# Patient Record
Sex: FEMALE
[De-identification: names, ages, dates, MRNs, and addresses within clinical notes are randomized per-mention and may not be internally consistent; named-entity substitution may affect disease eponyms.]

---

## 2017-12-27 ENCOUNTER — HOSPITAL ENCOUNTER (OUTPATIENT)
Dept: HOSPITAL 5 - XRAY | Age: 52
Discharge: HOME | End: 2017-12-27
Attending: INTERNAL MEDICINE
Payer: COMMERCIAL

## 2017-12-27 DIAGNOSIS — M17.0: Primary | ICD-10-CM

## 2017-12-27 DIAGNOSIS — M25.462: ICD-10-CM

## 2017-12-27 DIAGNOSIS — J45.909: ICD-10-CM

## 2017-12-27 DIAGNOSIS — M25.461: ICD-10-CM

## 2017-12-27 DIAGNOSIS — J44.9: ICD-10-CM

## 2017-12-27 DIAGNOSIS — M12.88: ICD-10-CM

## 2017-12-27 DIAGNOSIS — M47.896: ICD-10-CM

## 2017-12-27 DIAGNOSIS — G56.00: ICD-10-CM

## 2017-12-27 DIAGNOSIS — G62.9: ICD-10-CM

## 2017-12-27 PROCEDURE — 72100 X-RAY EXAM L-S SPINE 2/3 VWS: CPT

## 2017-12-27 NOTE — XRAY REPORT
AP AND LATERAL LUMBOSACRAL SPINE:



History: Back pain



Normal bone mineralization. There is moderate disc space narrowing with 

anterior spurring near the thoracolumbar junction. There is moderate to 

severe hypertrophic facet arthropathy at L3-4, L4-5 and L5-S1. Grade 

one anterolisthesis of L4 with respect to L5 measures 3-4 mm. No 

evidence for fracture or bone lesion.



IMPRESSION:

Lumbar spondylosis as described.

## 2017-12-27 NOTE — XRAY REPORT
BILATERAL KNEES, 2 VIEWS



History: Bilateral knee pain.



Findings: Normal bone mineralization. Moderate osteoarthritic changes 

are identified in the medial compartments and patellofemoral spaces of 

both knees. No fracture, bone lesion or osteochondral defect is 

identified. Small bilateral joint effusions.



Impression: Osteoarthritis. Small knee effusions. No acute process 

noted.

## 2018-06-05 ENCOUNTER — HOSPITAL ENCOUNTER (EMERGENCY)
Dept: HOSPITAL 5 - ED | Age: 53
LOS: 1 days | Discharge: HOME | End: 2018-06-06
Payer: MEDICAID

## 2018-06-05 DIAGNOSIS — M19.90: ICD-10-CM

## 2018-06-05 DIAGNOSIS — F17.200: ICD-10-CM

## 2018-06-05 DIAGNOSIS — I10: ICD-10-CM

## 2018-06-05 DIAGNOSIS — Z79.899: ICD-10-CM

## 2018-06-05 DIAGNOSIS — J45.31: Primary | ICD-10-CM

## 2018-06-05 DIAGNOSIS — G62.9: ICD-10-CM

## 2018-06-05 DIAGNOSIS — J06.9: ICD-10-CM

## 2018-06-05 DIAGNOSIS — G89.29: ICD-10-CM

## 2018-06-05 PROCEDURE — 94640 AIRWAY INHALATION TREATMENT: CPT

## 2018-06-05 PROCEDURE — 99283 EMERGENCY DEPT VISIT LOW MDM: CPT

## 2018-06-06 VITALS — DIASTOLIC BLOOD PRESSURE: 73 MMHG | SYSTOLIC BLOOD PRESSURE: 156 MMHG

## 2018-06-06 NOTE — EMERGENCY DEPARTMENT REPORT
ED Asthma HPI





- General


Chief Complaint: Adult Asthma


Stated Complaint: VAISHALI


Time Seen by Provider: 18 03:26


Source: patient


Mode of arrival: Ambulatory


Limitations: No Limitations





- History of Present Illness


Initial Comments: 





Patient reports that she is out of her asthma inhaler and she is having cough, 

worse this slightly thin.  She says she needs a refill.  Denies any nausea or 

vomiting.  Denies any fever or chills.  Denies any chest pain or shortness of 

breath.  Pain is 0-10.  No medication taken prior to coming to the emergency 

room because she said she is out of her inhaler.  She says she started with 

runny nose and nasal congestion about a week ago and it turned into asthma.


MD Complaint: "asthma attack", wheezing


Onset/Timin


-: week(s)


Asthma History: adult onset, history of prior ED visit


Context: recent URI, ran out of meds


Associated Symptoms: dry cough, other (hoarseness).  denies: productive cough, 

fever, chest pain, hemoptysis, leg edema, syncope


Treatments Prior to Arrival: other (none)





- Related Data


Current Asthma Therapy: inhaled bronchodilator


 Home Medications











 Medication  Instructions  Recorded  Confirmed  Last Taken


 


Fluticasone Propionate [Flovent 1 puff IH BID 14





Diskus]    


 


Meloxicam [Mobic] 15 mg PO QDAY 14 08:00








 Previous Rx's











 Medication  Instructions  Recorded  Last Taken  Type


 


Albuterol  *Only Ed* [Proventil 2.5 mg IH Q3H PRN #60 nebu 14 Unknown Rx





0.5% NEBS]    


 


Ipratropium/Albuterol Sulfate 1 ampul IH Q6HRT #60 ampul.neb 14 Unknown Rx





[DUONEB *Not for PRN Use*]    


 


oxyCODONE /ACETAMINOPHEN [Percocet 1 tab PO Q6H PRN #30 tablet 14 Unknown 

Rx





5/325 mg]    


 


ALBUTEROL Inhaler [ProAir HFA 2 puff INHALATION Q6H PRN #1 inha 18 

Unknown Rx





Inhaler]    


 


ALBUTEROL NEB's [Proventil 0.083% 3 ml INHALATION Q6H PRN #1 pack 06/06/18 

Unknown Rx





NEBS]    


 


Azithromycin [Zithromax Z-DENIS] 250 mg PO DAILY 5 Days #6 tab 18 Unknown Rx


 


Cetirizine HCl [ZyrTEC] 10 mg PO QDAY 14 Days #14 capsule 18 Unknown Rx


 


Fluticasone [Flonase] 1 spray NS QDAY 14 Days #1 bottle 18 Unknown Rx











 Allergies











Allergy/AdvReac Type Severity Reaction Status Date / Time


 


No Known Allergies Allergy   Unverified 14 15:25














ED Review of Systems


ROS: 


Stated complaint: VAISHALI


Other details as noted in HPI





Constitutional: denies: chills, fever


Eyes: denies: eye pain, eye discharge, vision change


ENT: congestion, other (hoarseness due to drainage).  denies: ear pain, throat 

pain


Respiratory: cough.  denies: shortness of breath, SOB with exertion, SOB at rest

, stridor, wheezing


Cardiovascular: denies: chest pain, palpitations, edema, syncope


Gastrointestinal: denies: abdominal pain, nausea, vomiting


Musculoskeletal: denies: back pain, joint swelling, arthralgia, myalgia


Skin: denies: rash, lesions


Neurological: denies: headache, weakness, paresthesias, abnormal gait, vertigo





ED Past Medical Hx





- Past Medical History


Previous Medical History?: Yes


Hx Congestive Heart Failure: No


Hx Diabetes: No


Hx Arthritis: Yes


Hx Asthma: Yes


Hx COPD: No


Additional medical history: neuropathy,carpal tunnel, chronic back pain





- Surgical History


Past Surgical History?: Yes


Additional Surgical History: carpal tunnel





- Family History


Family history: hypertension





- Social History


Smoking Status: Current Every Day Smoker


Substance Use Type: None





- Medications


Home Medications: 


 Home Medications











 Medication  Instructions  Recorded  Confirmed  Last Taken  Type


 


Fluticasone Propionate [Flovent 1 puff IH BID 14 History





Diskus]     


 


Meloxicam [Mobic] 15 mg PO QDAY 14 08:00 History


 


Albuterol  *Only Ed* [Proventil 2.5 mg IH Q3H PRN #60 nebu 14  Unknown Rx





0.5% NEBS]     


 


Ipratropium/Albuterol Sulfate 1 ampul IH Q6HRT #60 ampul.neb 14  Unknown 

Rx





[DUONEB *Not for PRN Use*]     


 


oxyCODONE /ACETAMINOPHEN [Percocet 1 tab PO Q6H PRN #30 tablet 14  

Unknown Rx





5/325 mg]     


 


ALBUTEROL Inhaler [ProAir HFA 2 puff INHALATION Q6H PRN #1 inha 18  

Unknown Rx





Inhaler]     


 


ALBUTEROL NEB's [Proventil 0.083% 3 ml INHALATION Q6H PRN #1 pack 18  

Unknown Rx





NEBS]     


 


Azithromycin [Zithromax Z-DENIS] 250 mg PO DAILY 5 Days #6 tab 18  Unknown 

Rx


 


Cetirizine HCl [ZyrTEC] 10 mg PO QDAY 14 Days #14 capsule 18  Unknown Rx


 


Fluticasone [Flonase] 1 spray NS QDAY 14 Days #1 bottle 18  Unknown Rx














ED Physical Exam





- General


Limitations: No Limitations


General appearance: alert, in no apparent distress





- Head


Head exam: Present: atraumatic, normocephalic, normal inspection





- Eye


Eye exam: Present: normal appearance, PERRL, EOMI


Pupils: Present: normal accommodation





- ENT


ENT exam: Present: normal exam, normal orophraynx, mucous membranes moist, 

normal external ear exam, other (lateral nasal mucosa pale and boggy).  Absent: 

TM's normal bilaterally (bilateral TM congested without erythema)





- Neck


Neck exam: Present: normal inspection, full ROM, other (no C-spine tenderness).

  Absent: tenderness, meningismus, lymphadenopathy





- Respiratory


Respiratory exam: Present: wheezes, other (dry cough).  Absent: normal lung 

sounds bilaterally, respiratory distress, rales, rhonchi, stridor, chest wall 

tenderness, accessory muscle use, decreased breath sounds, prolonged expiratory





- Cardiovascular


Cardiovascular Exam: Present: regular rate, normal rhythm, normal heart sounds.

  Absent: systolic murmur, diastolic murmur





- GI/Abdominal


GI/Abdominal exam: Present: soft, normal bowel sounds.  Absent: tenderness





- Extremities Exam


Extremities exam: Present: normal inspection, full ROM, normal capillary refill

, other (no clubbing, cyanosis or edema.  +2 pulses all extremities and no 

neurovascular compromise).  Absent: tenderness, pedal edema, joint swelling, 

calf tenderness





- Back Exam


Back exam: Present: normal inspection, full ROM.  Absent: tenderness, CVA 

tenderness (L)





- Neurological Exam


Neurological exam: Present: alert, oriented X3, normal gait, reflexes normal.  

Absent: motor sensory deficit





- Psychiatric


Psychiatric exam: Present: normal affect, normal mood





- Skin


Skin exam: Present: warm, dry, intact, normal color.  Absent: rash





ED Course


 Vital Signs











  18





  21:16 21:20 21:31


 


Temperature 98.4 F  


 


Pulse Rate 74  


 


Pulse Rate [  66 70





Anterior   





Bilateral   





Throughout]   


 


Respiratory 18  





Rate   


 


Respiratory  24 20





Rate [Anterior   





Bilateral   





Throughout]   


 


Blood Pressure 167/107  


 


Blood Pressure   





[Right]   


 


O2 Sat by Pulse 98  





Oximetry   














  18





  03:55 04:32


 


Temperature 97.6 F 97.7 F


 


Pulse Rate 57 L 56 L


 


Pulse Rate [  





Anterior  





Bilateral  





Throughout]  


 


Respiratory 18 18





Rate  


 


Respiratory  





Rate [Anterior  





Bilateral  





Throughout]  


 


Blood Pressure  


 


Blood Pressure 167/97 156/73





[Right]  


 


O2 Sat by Pulse 99 100





Oximetry  














- Reevaluation(s)


Reevaluation #1: 





18 03:41


Patient received 1 amp nebulizer in triage area and still with some wheezing 

and cough and so she'll receive an additional DuoNeb with 60 mg of prednisone 

and we will reevaluate.  Also recheck blood pressure as its elevated








Reevaluation #2: 





18 05:11


Patient was given additional DuoNeb 1 treatment along with the physical 60 mg 

by mouth and upon reevaluation her lung sounds are clear and she says she is 

feeling much better.





ED Medical Decision Making





- Medical Decision Making





ED course:





This is a 52-year-old female here for asthma exacerbation which she said 

started off as upper respiratory now he is wheezing and coughing.  He said he 

ran out of his medication and here to see if he can get refill.





Patient was seen by myself and examined and he is stable after nebulizer 

treatments and steroid.  Patient states that she is feeling better.  I 

discussed diagnosis and treatment plan the patient and she voiced understanding.





A/P


1: Acute exacerbation of asthma, mild-nebulizer treatment in emergency room to 

include DuoNeb 2 and prednisone 60 mg by mouth and will discharge home and 

prednisone and refill his albuterol inhaler and nebulizer and will discharge 

with Z-Denis since she's been having symptoms for over a week.


2: Upper respiratory with cough and congestion-sent home in Zyrtec and Flonase.





Patient given prescription for Z-Denis, Zyrtec, Flonase, prednisone Dosepak and 

albuterol.





Pt educated on diagnosis, medication, need to follow-up, and since she does  

have a primary care physician who is Dr. Adam management of chronic asthma and 

follow-up status post asthma exacerbation





Patient discharged home in stable condition to follow-up with primary care as 

referred.  Her vital signs are stable she is afebrile.  She said he is feeling 

much better.  I discussed with her to return to the emergency room if his 

symptoms turns and/or worsens 


Critical care attestation.: 


If time is entered above; I have spent that time in minutes in the direct care 

of this critically ill patient, excluding procedure time.








ED Disposition


Clinical Impression: 


 Upper respiratory infection with cough and congestion





Asthma attack


Qualifiers:


 Asthma severity: mild Asthma persistence: persistent Qualified Code(s): J45.31 

- Mild persistent asthma with (acute) exacerbation





Disposition:  TO HOME OR SELFCARE


Is pt being admited?: No


Does the pt Need Aspirin: No


Condition: Stable


Instructions:  Asthma (ED), Upper Respiratory Infection (ED), Acute Cough (ED)


Additional Instructions: 


Please increase your fluid intake


Flush nostrils with saline nasal spray 


take antibiotic as prescribed


Take albuterol nebulizer every 6 hours 2 days and then as needed.


Take Zyrtec and Flonase for allergic rhinitis, upper respiratory tract 

infection will cough and congestion


F/U  with primary care physician as instructed


Prescriptions: 


ALBUTEROL Inhaler [ProAir HFA Inhaler] 2 puff INHALATION Q6H PRN #1 inha


 PRN Reason: Shortness Of Breath


ALBUTEROL NEB's [Proventil 0.083% NEBS] 3 ml INHALATION Q6H PRN #1 pack


 PRN Reason: wheezing and cough


Azithromycin [Zithromax Z-DENIS] 250 mg PO DAILY 5 Days #6 tab


Cetirizine HCl [ZyrTEC] 10 mg PO QDAY 14 Days #14 capsule


Fluticasone [Flonase] 1 spray NS QDAY 14 Days #1 bottle


Referrals: 


HALIMA ADAM MD [Staff Physician] - 18


DIO WONG MD [Staff Physician] - 18


Forms:  Work/School Release Form(ED)

## 2020-09-24 ENCOUNTER — HOSPITAL ENCOUNTER (EMERGENCY)
Dept: HOSPITAL 5 - ED | Age: 55
Discharge: HOME | End: 2020-09-24
Payer: COMMERCIAL

## 2020-09-24 VITALS — SYSTOLIC BLOOD PRESSURE: 178 MMHG | DIASTOLIC BLOOD PRESSURE: 98 MMHG

## 2020-09-24 DIAGNOSIS — M19.90: ICD-10-CM

## 2020-09-24 DIAGNOSIS — J44.9: Primary | ICD-10-CM

## 2020-09-24 DIAGNOSIS — Z79.899: ICD-10-CM

## 2020-09-24 DIAGNOSIS — F17.200: ICD-10-CM

## 2020-09-24 PROCEDURE — 99282 EMERGENCY DEPT VISIT SF MDM: CPT

## 2020-09-24 NOTE — EMERGENCY DEPARTMENT REPORT
ED General Adult HPI





- General


Chief complaint: Adult Asthma


Stated complaint: ASTHMA AND ALLERGIES


Time Seen by Provider: 09/24/20 10:13


Source: patient


Mode of arrival: Ambulatory


Limitations: No Limitations





- History of Present Illness


Initial comments: 





Is a pleasant 34-year-old female presents the emergency department with chief 

complaint of cough and tightness in her chest.  Patient has a past medical 

history of asthma and reports she is out of her nebulizer and inhalers.  She 

states this happens every year this time of year.  She reports symptoms started 

2 days ago and she had been sleeping with her windows open is concerned maybe 

the change in the weather may have caused her symptoms again.  She denies any 

associated fever, chills, night sweats, headache, dizziness, blurry vision, 

nausea,, diarrhea, chest pain, shortness of breath.  She denies any sick 

contacts or known exposures to anyone with COVID-19.  She does report she smokes

tobacco daily.





- Related Data


                                Home Medications











 Medication  Instructions  Recorded  Confirmed  Last Taken


 


Fluticasone Propionate [Flovent 1 puff IH BID 01/06/14 01/06/14 01/06/14





Diskus]    


 


Meloxicam [Mobic] 15 mg PO QDAY 01/06/14 01/07/14 01/06/14 08:00








                                  Previous Rx's











 Medication  Instructions  Recorded  Last Taken  Type


 


Albuterol  *Only Ed* [Proventil 2.5 mg IH Q3H PRN #60 nebu 01/09/14 Unknown Rx





0.5% NEBS]    


 


oxyCODONE /ACETAMINOPHEN [Percocet 1 tab PO Q6H PRN #30 tablet 01/09/14 Unknown 

Rx





5/325 mg]    


 


ALBUTEROL NEB's [Proventil 0.083% 3 ml INHALATION Q6H PRN #1 pack 06/06/18 

Unknown Rx





NEBS]    


 


Fluticasone [Flonase] 1 spray NS QDAY 14 Days #1 bottle 06/06/18 Unknown Rx


 


Albuterol Mdi (or & Nicu Only) 2 puff INHALATION Q6H PRN #1 inha 09/24/20 

Unknown Rx





[ProAir HFA Inhaler]    


 


Azithromycin [Zithromax Z-DALILA] 250 mg PO DAILY 5 Days #6 tab 09/24/20 Unknown Rx


 


Cetirizine HCl [ZyrTEC 10mg cap] 10 mg PO QDAY 14 Days #14 capsule 09/24/20 

Unknown Rx


 


Ipratropium/Albuterol Sulfate 1 ampul IH Q6HRT #60 ampul.neb 09/24/20 Unknown Rx





[DUONEB *Not for PRN Use*]    











                                    Allergies











Allergy/AdvReac Type Severity Reaction Status Date / Time


 


No Known Allergies Allergy   Unverified 01/06/14 15:25














ED Review of Systems


ROS: 


Stated complaint: ASTHMA AND ALLERGIES


Other details as noted in HPI





Comment: All other systems reviewed and negative


Constitutional: denies: chills, fever


Eyes: denies: eye pain, eye discharge, vision change


ENT: denies: ear pain, throat pain


Respiratory: no symptoms reported, cough, wheezing.  denies: shortness of breath


Cardiovascular: denies: chest pain, palpitations


Endocrine: no symptoms reported


Gastrointestinal: denies: abdominal pain, nausea, diarrhea


Genitourinary: denies: urgency, dysuria, discharge


Musculoskeletal: denies: back pain, joint swelling, arthralgia


Skin: denies: rash, lesions


Neurological: denies: headache, weakness, paresthesias


Psychiatric: denies: anxiety, depression


Hematological/Lymphatic: denies: easy bleeding, easy bruising





ED Past Medical Hx





- Past Medical History


Previous Medical History?: Yes


Hx Congestive Heart Failure: No


Hx Diabetes: No


Hx Arthritis: Yes


Hx Asthma: Yes


Hx COPD: No


Additional medical history: neuropathy,carpal tunnel, chronic back pain





- Surgical History


Past Surgical History?: Yes


Additional Surgical History: carpal tunnel





- Social History


Smoking Status: Current Every Day Smoker





- Medications


Home Medications: 


                                Home Medications











 Medication  Instructions  Recorded  Confirmed  Last Taken  Type


 


Fluticasone Propionate [Flovent 1 puff IH BID 01/06/14 01/06/14 01/06/14 History





Diskus]     


 


Meloxicam [Mobic] 15 mg PO QDAY 01/06/14 01/07/14 01/06/14 08:00 History


 


Albuterol  *Only Ed* [Proventil 2.5 mg IH Q3H PRN #60 nebu 01/09/14  Unknown Rx





0.5% NEBS]     


 


oxyCODONE /ACETAMINOPHEN [Percocet 1 tab PO Q6H PRN #30 tablet 01/09/14  Unknown

 Rx





5/325 mg]     


 


ALBUTEROL NEB's [Proventil 0.083% 3 ml INHALATION Q6H PRN #1 pack 06/06/18  

Unknown Rx





NEBS]     


 


Fluticasone [Flonase] 1 spray NS QDAY 14 Days #1 bottle 06/06/18  Unknown Rx


 


Albuterol Mdi (or & Nicu Only) 2 puff INHALATION Q6H PRN #1 inha 09/24/20  Unkn

own Rx





[ProAir HFA Inhaler]     


 


Azithromycin [Zithromax Z-DALILA] 250 mg PO DAILY 5 Days #6 tab 09/24/20  Unknown 

Rx


 


Cetirizine HCl [ZyrTEC 10mg cap] 10 mg PO QDAY 14 Days #14 capsule 09/24/20  Unk

nown Rx


 


Ipratropium/Albuterol Sulfate 1 ampul IH Q6HRT #60 ampul.neb 09/24/20  Unknown 

Rx





[DUONEB *Not for PRN Use*]     














ED Physical Exam





- General


Limitations: No Limitations


General appearance: alert, in no apparent distress





- Head


Head exam: Present: atraumatic, normocephalic





- Eye


Eye exam: Present: normal appearance, PERRL, EOMI


Pupils: Present: normal accommodation





- ENT


ENT exam: Present: normal exam, normal orophraynx, mucous membranes moist





- Neck


Neck exam: Present: normal inspection, full ROM.  Absent: tenderness, 

meningismus





- Respiratory


Respiratory exam: Present: normal lung sounds bilaterally.  Absent: respiratory 

distress, wheezes, rales, rhonchi, stridor





- Cardiovascular


Cardiovascular Exam: Present: regular rate, normal rhythm.  Absent: systolic 

murmur, diastolic murmur, rubs, gallop





- GI/Abdominal


GI/Abdominal exam: Present: soft, normal bowel sounds.  Absent: distended, 

tenderness, guarding, rebound, rigid





- Extremities Exam


Extremities exam: Present: normal inspection, full ROM, normal capillary refill.

 Absent: tenderness, calf tenderness





- Back Exam


Back exam: Present: normal inspection, full ROM.  Absent: tenderness, CVA 

tenderness (R), CVA tenderness (L)





- Neurological Exam


Neurological exam: Present: alert, oriented X3





- Psychiatric


Psychiatric exam: Present: normal affect, normal mood





- Skin


Skin exam: Present: warm, dry, intact, normal color.  Absent: rash





ED Course





                                   Vital Signs











  09/24/20





  09:50


 


Temperature 98.5 F


 


Pulse Rate 79


 


Respiratory 20





Rate 


 


Blood Pressure 178/98


 


O2 Sat by Pulse 96





Oximetry 














ED Medical Decision Making





- Medical Decision Making





Patient is nontoxic in no acute distress.  Vital signs are stable.  She was not 

hypoxic, had no wheezing or abnormal lung sounds, and had no increased work of 

breathing.  I did initially order a chest x-ray however the patient politely 

declined states she does want a refill of her medications.  I did educate her 

that she should return the emerge department meal if she develops any change or 

worsening symptoms.  She verbalized understanding of the diagnosis, treatment 

plan and follow-up instructions will follow-up with her primary care doctor.





- Differential Diagnosis


copd, asthma, URI, pneumonia 


Critical care attestation.: 


If time is entered above; I have spent that time in minutes in the direct care 

of this critically ill patient, excluding procedure time.








ED Disposition


Clinical Impression: 


COPD (chronic obstructive pulmonary disease)


Qualifiers:


 COPD type: COPD with acute exacerbation Qualified Code(s): J44.1 - Chronic 

obstructive pulmonary disease with (acute) exacerbation





Disposition: DC-01 TO HOME OR SELFCARE


Is pt being admited?: No


Condition: Stable


Instructions:  Chronic Obstructive Pulmonary Disease (ED)


Prescriptions: 


Ipratropium/Albuterol Sulfate [DUONEB *Not for PRN Use*] 1 ampul IH Q6HRT #60 

ampul.neb


Albuterol Mdi (or & Nicu Only) [ProAir HFA Inhaler] 2 puff INHALATION Q6H PRN #1

inha


 PRN Reason: Shortness Of Breath


Azithromycin [Zithromax Z-DALILA] 250 mg PO DAILY 5 Days #6 tab


Cetirizine HCl [ZyrTEC 10mg cap] 10 mg PO QDAY 14 Days #14 capsule


Referrals: 


[facility] [Provider Group] - 3-5 Days


Time of Disposition: 10:49

## 2021-04-18 ENCOUNTER — HOSPITAL ENCOUNTER (EMERGENCY)
Dept: HOSPITAL 5 - ED | Age: 56
Discharge: HOME | End: 2021-04-18
Payer: MEDICAID

## 2021-04-18 VITALS — SYSTOLIC BLOOD PRESSURE: 112 MMHG | DIASTOLIC BLOOD PRESSURE: 61 MMHG

## 2021-04-18 DIAGNOSIS — Z98.890: ICD-10-CM

## 2021-04-18 DIAGNOSIS — M62.830: Primary | ICD-10-CM

## 2021-04-18 DIAGNOSIS — M19.91: ICD-10-CM

## 2021-04-18 DIAGNOSIS — J45.909: ICD-10-CM

## 2021-04-18 DIAGNOSIS — Z79.899: ICD-10-CM

## 2021-04-18 DIAGNOSIS — F17.200: ICD-10-CM

## 2021-04-18 PROCEDURE — 99281 EMR DPT VST MAYX REQ PHY/QHP: CPT

## 2021-04-18 NOTE — EMERGENCY DEPARTMENT REPORT
ED Back Pain/Injury HPI





- General


Chief Complaint: Back Pain/Injury


Stated Complaint: MUSCLE SPASMS IN BACK


Time Seen by Provider: 04/18/21 18:19


Source: patient


Limitations: No Limitations





- History of Present Illness


Initial Comments: 


55-year-old female with a past medical history of hypertension, hyperlipidemia, 

asthma, asthma and obesity presents to the ER today with complaints of right 

lower back pain.  Patient states that it feels like she is having spasms in her 

right lower back.  She states that it started gradually about 3 days ago, it was

initially intermittent but has become more constant.  She states that the pain 

is worse with movement.  She denies any radiation of the pain down into her leg.

 She denies any associated bowel or bladder incontinence.  She denies any saddle

anesthesia.  She denies any urinary retention or constipation.  She reports her 

typical chronic lower extremity neuropathy but nothing worse since her back 

pain.  She denies any associated abdominal pain or chest pain.  She states that 

she been taking Tylenol and naproxen without relief.  She states that she has 

had lower back pain in the past, but she has never officially been seen by a 

provider or orthopedic specialist to figure out what is been going on with her 

back.


MD Complaint: back pain


-: Gradual, days(s) (3)





- Related Data


                                Home Medications











 Medication  Instructions  Recorded  Confirmed  Last Taken


 


Fluticasone Propionate [Flovent 1 puff IH BID 01/06/14 01/06/14 01/06/14





Diskus]    


 


Meloxicam [Mobic] 15 mg PO QDAY 01/06/14 01/07/14 01/06/14 08:00








                                  Previous Rx's











 Medication  Instructions  Recorded  Last Taken  Type


 


Albuterol  *Only Ed* [Proventil 2.5 mg IH Q3H PRN #60 nebu 01/09/14 Unknown Rx





0.5% NEBS]    


 


ALBUTEROL NEB's [Proventil 0.083% 3 ml INHALATION Q6H PRN #1 pack 06/06/18 

Unknown Rx





NEBS]    


 


Albuterol Mdi (or & Nicu Only) 2 puff INHALATION Q6H PRN #1 inha 09/24/20 

Unknown Rx





[ProAir HFA Inhaler]    


 


Azithromycin [Zithromax Z-DALILA] 250 mg PO DAILY 5 Days #6 tab 09/24/20 Unknown Rx


 


Ipratropium/Albuterol Sulfate 1 ampul IH Q6HRT #60 ampul.neb 09/24/20 Unknown Rx





[DUONEB *Not for PRN Use*]    


 


Ketorolac [Toradol] 10 mg PO Q6H PRN #20 tablet 04/18/21 Unknown Rx


 


Lidocaine [Lidoderm] 1 each TP Q12HR #10 adh..patch 04/18/21 Unknown Rx


 


methOCARBAMOL [Robaxin TAB] 750 mg PO Q8H PRN #30 tablet 04/18/21 Unknown Rx











                                    Allergies











Allergy/AdvReac Type Severity Reaction Status Date / Time


 


No Known Allergies Allergy   Verified 04/18/21 17:56














ED Review of Systems


ROS: 


Stated complaint: MUSCLE SPASMS IN BACK


Other details as noted in HPI





Comment: All other systems reviewed and negative


Constitutional: denies: chills, fever


Eyes: denies: eye pain, eye discharge, vision change


ENT: denies: ear pain, throat pain


Cardiovascular: denies: chest pain, palpitations, dyspnea on exertion, edema, 

syncope, paroxysmal nocturnal dyspnea


Endocrine: no symptoms reported


Gastrointestinal: denies: abdominal pain, nausea, diarrhea, constipation, 

hematemesis, melena, hematochezia


Genitourinary: denies: urgency, dysuria, frequency, hematuria, discharge, 

abnormal menses, dyspareunia


Musculoskeletal: back pain.  denies: joint swelling, arthralgia, myalgia, other


Skin: denies: rash, lesions, change in color, change in hair/nails, pruritus


Neurological: denies: headache, weakness, confusion, abnormal gait, vertigo


Psychiatric: denies: anxiety, depression, auditory hallucinations, visual 

hallucinations, homicidal thoughts


Hematological/Lymphatic: denies: easy bleeding, easy bruising





ED Past Medical Hx





- Past Medical History


Hx Congestive Heart Failure: No


Hx Diabetes: No


Hx Arthritis: Yes


Hx Asthma: Yes


Hx COPD: No


Additional medical history: neuropathy,carpal tunnel, chronic back pain





- Surgical History


Additional Surgical History: carpal tunnel





- Social History


Smoking Status: Current Every Day Smoker


Substance Use Type: None





- Medications


Home Medications: 


                                Home Medications











 Medication  Instructions  Recorded  Confirmed  Last Taken  Type


 


Fluticasone Propionate [Flovent 1 puff IH BID 01/06/14 01/06/14 01/06/14 History





Diskus]     


 


Meloxicam [Mobic] 15 mg PO QDAY 01/06/14 01/07/14 01/06/14 08:00 History


 


Albuterol  *Only Ed* [Proventil 2.5 mg IH Q3H PRN #60 nebu 01/09/14  Unknown Rx





0.5% NEBS]     


 


ALBUTEROL NEB's [Proventil 0.083% 3 ml INHALATION Q6H PRN #1 pack 06/06/18  

Unknown Rx





NEBS]     


 


Albuterol Mdi (or & Nicu Only) 2 puff INHALATION Q6H PRN #1 inha 09/24/20  

Unknown Rx





[ProAir HFA Inhaler]     


 


Azithromycin [Zithromax Z-DALILA] 250 mg PO DAILY 5 Days #6 tab 09/24/20  Unknown 

Rx


 


Ipratropium/Albuterol Sulfate 1 ampul IH Q6HRT #60 ampul.neb 09/24/20  Unknown 

Rx





[DUONEB *Not for PRN Use*]     


 


Ketorolac [Toradol] 10 mg PO Q6H PRN #20 tablet 04/18/21  Unknown Rx


 


Lidocaine [Lidoderm] 1 each TP Q12HR #10 adh..patch 04/18/21  Unknown Rx


 


methOCARBAMOL [Robaxin TAB] 750 mg PO Q8H PRN #30 tablet 04/18/21  Unknown Rx














ED Physical Exam





- General


Limitations: No Limitations


General appearance: alert, in no apparent distress, obese





- Head


Head exam: Present: atraumatic, normocephalic, normal inspection





- Neck


Neck exam: Present: normal inspection, full ROM





- Respiratory


Respiratory exam: Present: normal lung sounds bilaterally.  Absent: respiratory 

distress





- Cardiovascular


Cardiovascular Exam: Present: regular rate, normal rhythm, normal heart sounds





- GI/Abdominal


GI/Abdominal exam: Present: soft.  Absent: distended, tenderness, guarding, 

rebound





- Extremities Exam


Extremities exam: Present: normal inspection, full ROM, normal capillary refill.

 Absent: pedal edema, calf tenderness





- Back Exam


Back exam: Present: full ROM, paraspinal tenderness (right lower lumbar area).  

Absent: normal inspection, CVA tenderness (R), CVA tenderness (L), vertebral 

tenderness, rash noted





- Neurological Exam


Neurological exam: Present: alert, oriented X3, CN II-XII intact, normal gait





- Psychiatric


Psychiatric exam: Present: normal affect, normal mood





- Skin


Skin exam: Present: intact





ED Course


                                   Vital Signs











  04/18/21





  17:53


 


Temperature 98.5 F


 


Pulse Rate 95 H


 


Respiratory 20





Rate 


 


Blood Pressure 112/61


 


O2 Sat by Pulse 98





Oximetry 














ED Medical Decision Making





- Medical Decision Making


The patient presented with acute back pain.  The patient is resting comfortably 

and is alert, talkative, interactive and in no distress.   The patient is 

neurologically intact and is ambulatory in the ED. the patient has no fever, no 

bowel or bladder incontinence, no saddle anesthesia and is otherwise alert and 

well-appearing.  Her history, physical examination and diagnostic testing does 

not suggest the presence of acute spinal epidural abscess, acute epidural bleed,

cauda equina syndrome, abdominal/thoracic aortic aneurysm, aortic dissection or 

other acute process requiring further testing, treatment or consultation in the 

emergency department.  Her vital signs have been stable.  The patient condition 

is stable and appropriate for discharge.  The patient will pursue further 

outpatient evaluation with the primary care physician or other designated or 

consulting physician as indicated in the discharge instructions.





Critical care attestation.: 


If time is entered above; I have spent that time in minutes in the direct care 

of this critically ill patient, excluding procedure time.








ED Disposition


Clinical Impression: 


 Lumbar paraspinal muscle spasm





Disposition: DC-01 TO HOME OR SELFCARE


Is pt being admited?: No


Does the pt Need Aspirin: No


Condition: Stable


Instructions:  Muscle Cramps and Spasms, Acute Back Pain, Adult


Additional Instructions: 


Take the Robaxin, Toradol and use the Lidoderm patches as prescribed.  Recommend

that you follow-up with orthopedic specialist in 1 week if your symptoms 

persist.  Return to the ER if your symptoms worsens or changes in any way.


Prescriptions: 


Lidocaine [Lidoderm] 1 each TP Q12HR #10 adh..patch


methOCARBAMOL [Robaxin TAB] 750 mg PO Q8H PRN #30 tablet


 PRN Reason: Spasms


Ketorolac [Toradol] 10 mg PO Q6H PRN #20 tablet


 PRN Reason: Pain


Referrals: 


[facility] [Other] - 3-5 Days


Time of Disposition: 18:40

## 2021-08-29 ENCOUNTER — HOSPITAL ENCOUNTER (EMERGENCY)
Dept: HOSPITAL 5 - ED | Age: 56
Discharge: HOME | End: 2021-08-29
Payer: MEDICAID

## 2021-08-29 VITALS — SYSTOLIC BLOOD PRESSURE: 100 MMHG | DIASTOLIC BLOOD PRESSURE: 61 MMHG

## 2021-08-29 DIAGNOSIS — M19.90: ICD-10-CM

## 2021-08-29 DIAGNOSIS — M54.9: ICD-10-CM

## 2021-08-29 DIAGNOSIS — L02.31: Primary | ICD-10-CM

## 2021-08-29 DIAGNOSIS — Z98.890: ICD-10-CM

## 2021-08-29 DIAGNOSIS — G56.00: ICD-10-CM

## 2021-08-29 DIAGNOSIS — G62.9: ICD-10-CM

## 2021-08-29 DIAGNOSIS — F17.200: ICD-10-CM

## 2021-08-29 DIAGNOSIS — J45.909: ICD-10-CM

## 2021-08-29 PROCEDURE — 99282 EMERGENCY DEPT VISIT SF MDM: CPT

## 2022-03-29 ENCOUNTER — HOSPITAL ENCOUNTER (OUTPATIENT)
Dept: HOSPITAL 5 - XRAY | Age: 57
Discharge: HOME | End: 2022-03-29
Attending: ORTHOPAEDIC SURGERY
Payer: MEDICAID

## 2022-03-29 DIAGNOSIS — M19.012: Primary | ICD-10-CM

## 2022-03-29 NOTE — XRAY REPORT
LEFT SHOULDER 3 VIEWS



INDICATION:  M25.512 PAIN IN LEFT SHOULDER. 



COMPARISON: None.



IMPRESSION:  No acute osseous abnormality or bone lesion. Mild osteoarthritic changes are identified 
at the glenohumeral joint and AC joint. The soft tissues are unremarkable.



Signer Name: Dave Clarke Jr, MD 

Signed: 3/29/2022 11:59 AM

Workstation Name: [id number]

## 2022-04-04 ENCOUNTER — HOSPITAL ENCOUNTER (OUTPATIENT)
Dept: HOSPITAL 5 - OR | Age: 57
Discharge: HOME | End: 2022-04-04
Attending: ORTHOPAEDIC SURGERY
Payer: MEDICAID

## 2022-04-04 VITALS — SYSTOLIC BLOOD PRESSURE: 132 MMHG | DIASTOLIC BLOOD PRESSURE: 77 MMHG

## 2022-04-04 DIAGNOSIS — Z98.51: ICD-10-CM

## 2022-04-04 DIAGNOSIS — M19.90: ICD-10-CM

## 2022-04-04 DIAGNOSIS — Z83.3: ICD-10-CM

## 2022-04-04 DIAGNOSIS — F17.210: ICD-10-CM

## 2022-04-04 DIAGNOSIS — E66.9: ICD-10-CM

## 2022-04-04 DIAGNOSIS — Z79.899: ICD-10-CM

## 2022-04-04 DIAGNOSIS — E78.00: ICD-10-CM

## 2022-04-04 DIAGNOSIS — Z87.440: ICD-10-CM

## 2022-04-04 DIAGNOSIS — Z20.822: ICD-10-CM

## 2022-04-04 DIAGNOSIS — Z90.49: ICD-10-CM

## 2022-04-04 DIAGNOSIS — G56.02: Primary | ICD-10-CM

## 2022-04-04 DIAGNOSIS — J44.9: ICD-10-CM

## 2022-04-04 DIAGNOSIS — I10: ICD-10-CM

## 2022-04-04 DIAGNOSIS — Z82.61: ICD-10-CM

## 2022-04-04 DIAGNOSIS — Z98.890: ICD-10-CM

## 2022-04-04 DIAGNOSIS — Z82.49: ICD-10-CM

## 2022-04-04 PROCEDURE — U0003 INFECTIOUS AGENT DETECTION BY NUCLEIC ACID (DNA OR RNA); SEVERE ACUTE RESPIRATORY SYNDROME CORONAVIRUS 2 (SARS-COV-2) (CORONAVIRUS DISEASE [COVID-19]), AMPLIFIED PROBE TECHNIQUE, MAKING USE OF HIGH THROUGHPUT TECHNOLOGIES AS DESCRIBED BY CMS-2020-01-R: HCPCS

## 2022-04-04 PROCEDURE — 64721 CARPAL TUNNEL SURGERY: CPT

## 2022-04-04 NOTE — OPERATIVE REPORT
Operative Report


Operative Report: 





Preop diagnosis : Carpal tunnel syndrome, left hand





Postop diagnosis:  Same 


                                  


Procedure: Carpal tunnel release, left hand





Surgeon: Tyson Leiva MD





First assist: None





Anesthesia: General with MAC





Details of operative technique: Patient was prepared in same-day surgery.  She 

was then brought to the operating room where she underwent IV sedation and MAC 

anesthesia.


Then a wrist block was performed utilizing 8 cc of 1% lidocaine beginning on the

ulnar aspect of the wrist and going down into the carpal canal.  The hand was 

then prepped and draped in usual fashion with Hibiclens scrub and ChloraPrep 

solution.  A straight midline incision was made paralleling the thenar crease 

approximately 3 cm in length.  The dissection was carried down through the 

fibrofatty layer.  Bleeders were cauterized with the microtip Bovie.  The palmar

fascia was then divided and retractors were placed.  The distal edge of the 

transverse carpal ligament was identified and a small opening was created with 

tenotomy scissors.  Following this a [place] elevator was inserted underneath the 

ligament and with the wrist in extension the ligament was divided from distal to

proximal under direct vision.  Occipital portion was released with small 

tenotomy scissors with maximum dorsiflexion of the wrist.  The contents of the 

canal were then inspected.  The median nerve was seen to have an hourglass 

indentation.  There was no synovitis present.  There were no anomalous masses or

tendons present.  The wound was then irrigated with normal saline.  Skin was 

reapproximated utilizing 3-0 nylon sutures interrupted style.  A sterile 

compressive dressing was applied followed by a cock up splint which was secured 

with an Ace wrap.  The patient was then awakened and taken to recovery room in 

good condition.








Estimated blood loss: None





Replacement: See anesthesia record





Drains : None





Complications: None





Tourniquet time: 16 minutes

## 2022-04-04 NOTE — ANESTHESIA CONSULTATION
Anesthesia Consult and Med Hx


Date of service: 04/04/22





- Airway


Anesthetic Teeth Evaluation: Poor (multiple missing and decaying teeth; denies 

loose teeth)


ROM Head & Neck: Adequate


Mental/Hyoid Distance: Adequate


Mallampati Class: Class II


Intubation Access Assessment: Probably Good





- Pulmonary Exam


CTA: No (mild diffuse wheezing)





- Cardiac Exam


Cardiac Exam: RRR





- Pre-Operative Health Status


ASA Pre-Surgery Classification: ASA3


Proposed Anesthetic Plan: MAC





- Pulmonary


Hx Smoking: Yes (previous 1/2 PPD; quit 1 wk. Smoked "1 puff" this morning)


Hx Asthma: Yes (used advair at home this morning, will give albuterol neb in 

preop)


COPD: Yes


Home Oxygen Therapy: No


Hx Sleep Apnea: No (SANDRA PRE SCREEN HIGH RISK)





- Cardiovascular System


Hx Hypertension: Yes


Hx Heart Attack/AMI: No


Hx Percutaneous Transluminal Coronary Angioplasty (PTCA): No





- Central Nervous System


CVA: No


Hx Back Pain: Yes (NECK AND BACK PAIN)





- Endocrine


Hx Renal Disease: No


Hx Liver Disease: No


Hx Insulin Dependent Diabetes: No


Hx Non-Insulin Dependent Diabetes: No


Hx Thyroid Disease: No





- Other Systems


Hx Substance Use: Yes (occasional THC; remote hx meth use)


Hx Cancer: No


Hx Obesity: Yes (BMI 41)





- Additional Comments


Anesthesia Medical History Comments: No hx anesthetic complications.

## 2024-03-05 NOTE — EMERGENCY DEPARTMENT REPORT
- General


Chief complaint: Skin/Abscess/Foreign Body


Stated complaint: BOIL/BUTTOCK


Time Seen by Provider: 08/29/21 14:51


Source: patient


Mode of arrival: Ambulatory


Limitations: No Limitations





- History of Present Illness


Initial comments: 





Is a 55-year-old female with past medical history of asthma who presents the 

emergency department chief complaint of a abscess on her right buttocks.  She 

reports this was about a quarter size 2 days ago and now has become 

significantly larger and more painful.  She has been trying to do home remedies 

with no relief in her symptoms.  She denies any injuries.  She denies any 

associated fever, chills, night sweats, headache, dizziness, blurry vision, 

nausea,, diarrhea, chest pain, shortness of breath, weakness or any other 

associated symptoms.  She does report earlier in the week she started to have 

flulike symptoms including body aches, fever, malaise and thought she may have 

had a Covid but she tested negative.  She reports those symptoms has resolved.  

She has not been on any antibiotics recently.





- Related Data


                                Home Medications











 Medication  Instructions  Recorded  Confirmed  Last Taken


 


Fluticasone Propionate [Flovent 1 puff IH BID 01/06/14 01/06/14 01/06/14





Diskus]    


 


Meloxicam [Mobic] 15 mg PO QDAY 01/06/14 01/07/14 01/06/14 08:00








                                  Previous Rx's











 Medication  Instructions  Recorded  Last Taken  Type


 


Albuterol  *Only Ed* [Proventil 2.5 mg IH Q3H PRN #60 nebu 01/09/14 Unknown Rx





0.5% NEBS]    


 


ALBUTEROL NEB's [Proventil 0.083% 3 ml INHALATION Q6H PRN #1 pack 06/06/18 

Unknown Rx





NEBS]    


 


Albuterol Mdi (or & Nicu Only) 2 puff INHALATION Q6H PRN #1 inha 09/24/20 

Unknown Rx





[ProAir HFA Inhaler]    


 


Azithromycin [Zithromax Z-DALILA] 250 mg PO DAILY 5 Days #6 tab 09/24/20 Unknown Rx


 


Ipratropium/Albuterol Sulfate 1 ampul IH Q6HRT #60 ampul.neb 09/24/20 Unknown Rx





[DUONEB *Not for PRN Use*]    


 


Ketorolac [Toradol] 10 mg PO Q6H PRN #20 tablet 04/18/21 Unknown Rx


 


Lidocaine [Lidoderm] 1 each TP Q12HR #10 adh..patch 04/18/21 Unknown Rx


 


methOCARBAMOL [Robaxin TAB] 750 mg PO Q8H PRN #30 tablet 04/18/21 Unknown Rx


 


Acetaminophen with Codeine 1 each PO Q6HR #12 tablet 08/29/21 Unknown Rx





[Acetaminophen-Codeine #4 TAB]    


 


Sulfamethoxazole/Trimethoprim 1 each PO BID #20 tablet 08/29/21 Unknown Rx





[Bactrim DS TAB]    


 


cephALEXin [Keflex] 500 mg PO Q6HR #40 capsule 08/29/21 Unknown Rx











                                    Allergies











Allergy/AdvReac Type Severity Reaction Status Date / Time


 


No Known Allergies Allergy   Verified 04/18/21 17:56














Abscess Boil HPI





- HPI


Chief Complaint: Skin/Abscess/Foreign Body


Stated Complaint: BOIL/BUTTOCK


Time Seen by Provider: 08/29/21 14:51


Home Medications: 


                                Home Medications











 Medication  Instructions  Recorded  Confirmed  Last Taken


 


Fluticasone Propionate [Flovent 1 puff IH BID 01/06/14 01/06/14 01/06/14





Diskus]    


 


Meloxicam [Mobic] 15 mg PO QDAY 01/06/14 01/07/14 01/06/14 08:00








                                  Previous Rx's











 Medication  Instructions  Recorded  Last Taken  Type


 


Albuterol  *Only Ed* [Proventil 2.5 mg IH Q3H PRN #60 nebu 01/09/14 Unknown Rx





0.5% NEBS]    


 


ALBUTEROL NEB's [Proventil 0.083% 3 ml INHALATION Q6H PRN #1 pack 06/06/18 

Unknown Rx





NEBS]    


 


Albuterol Mdi (or & Nicu Only) 2 puff INHALATION Q6H PRN #1 inha 09/24/20 

Unknown Rx





[ProAir HFA Inhaler]    


 


Azithromycin [Zithromax Z-DALILA] 250 mg PO DAILY 5 Days #6 tab 09/24/20 Unknown Rx


 


Ipratropium/Albuterol Sulfate 1 ampul IH Q6HRT #60 ampul.neb 09/24/20 Unknown Rx





[DUONEB *Not for PRN Use*]    


 


Ketorolac [Toradol] 10 mg PO Q6H PRN #20 tablet 04/18/21 Unknown Rx


 


Lidocaine [Lidoderm] 1 each TP Q12HR #10 adh..patch 04/18/21 Unknown Rx


 


methOCARBAMOL [Robaxin TAB] 750 mg PO Q8H PRN #30 tablet 04/18/21 Unknown Rx


 


Acetaminophen with Codeine 1 each PO Q6HR #12 tablet 08/29/21 Unknown Rx





[Acetaminophen-Codeine #4 TAB]    


 


Sulfamethoxazole/Trimethoprim 1 each PO BID #20 tablet 08/29/21 Unknown Rx





[Bactrim DS TAB]    


 


cephALEXin [Keflex] 500 mg PO Q6HR #40 capsule 08/29/21 Unknown Rx











Allergies/Adverse Reactions: 


                                    Allergies











Allergy/AdvReac Type Severity Reaction Status Date / Time


 


No Known Allergies Allergy   Verified 04/18/21 17:56














ED Review of Systems


ROS: 


Stated complaint: BOIL/BUTTOCK


Other details as noted in HPI





Comment: All other systems reviewed and negative


Constitutional: denies: chills, fever


Eyes: denies: eye pain, eye discharge, vision change


ENT: denies: ear pain, throat pain


Respiratory: denies: cough, shortness of breath, wheezing


Cardiovascular: denies: chest pain, palpitations


Endocrine: no symptoms reported


Gastrointestinal: denies: abdominal pain, nausea, diarrhea


Genitourinary: denies: urgency, dysuria, discharge


Musculoskeletal: denies: back pain, joint swelling, arthralgia


Skin: as per HPI.  denies: rash, lesions


Neurological: denies: headache, weakness, paresthesias


Psychiatric: denies: anxiety, depression


Hematological/Lymphatic: denies: easy bleeding, easy bruising





ED Past Medical Hx





- Past Medical History


Previous Medical History?: Yes


Hx Congestive Heart Failure: No


Hx Diabetes: No


Hx Arthritis: Yes


Hx Asthma: Yes


Hx COPD: No


Additional medical history: neuropathy,carpal tunnel, chronic back pain





- Surgical History


Past Surgical History?: Yes


Additional Surgical History: carpal tunnel





- Social History


Smoking Status: Current Every Day Smoker


Substance Use Type: Alcohol





- Medications


Home Medications: 


                                Home Medications











 Medication  Instructions  Recorded  Confirmed  Last Taken  Type


 


Fluticasone Propionate [Flovent 1 puff IH BID 01/06/14 01/06/14 01/06/14 History





Diskus]     


 


Meloxicam [Mobic] 15 mg PO QDAY 01/06/14 01/07/14 01/06/14 08:00 History


 


Albuterol  *Only Ed* [Proventil 2.5 mg IH Q3H PRN #60 nebu 01/09/14  Unknown Rx





0.5% NEBS]     


 


ALBUTEROL NEB's [Proventil 0.083% 3 ml INHALATION Q6H PRN #1 pack 06/06/18  

Unknown Rx





NEBS]     


 


Albuterol Mdi (or & Nicu Only) 2 puff INHALATION Q6H PRN #1 inha 09/24/20  

Unknown Rx





[ProAir HFA Inhaler]     


 


Azithromycin [Zithromax Z-DALILA] 250 mg PO DAILY 5 Days #6 tab 09/24/20  Unknown 

Rx


 


Ipratropium/Albuterol Sulfate 1 ampul IH Q6HRT #60 ampul.neb 09/24/20  Unknown 

Rx





[DUONEB *Not for PRN Use*]     


 


Ketorolac [Toradol] 10 mg PO Q6H PRN #20 tablet 04/18/21  Unknown Rx


 


Lidocaine [Lidoderm] 1 each TP Q12HR #10 adh..patch 04/18/21  Unknown Rx


 


methOCARBAMOL [Robaxin TAB] 750 mg PO Q8H PRN #30 tablet 04/18/21  Unknown Rx


 


Acetaminophen with Codeine 1 each PO Q6HR #12 tablet 08/29/21  Unknown Rx





[Acetaminophen-Codeine #4 TAB]     


 


Sulfamethoxazole/Trimethoprim 1 each PO BID #20 tablet 08/29/21  Unknown Rx





[Bactrim DS TAB]     


 


cephALEXin [Keflex] 500 mg PO Q6HR #40 capsule 08/29/21  Unknown Rx














ED Physical Exam





- General


Limitations: No Limitations


General appearance: alert, in no apparent distress





- Head


Head exam: Present: atraumatic, normocephalic





- Eye


Eye exam: Present: normal appearance, PERRL, EOMI


Pupils: Present: normal accommodation





- ENT


ENT exam: Present: normal exam, normal orophraynx, mucous membranes moist





- Neck


Neck exam: Present: normal inspection, full ROM.  Absent: tenderness, m

eningismus





- Respiratory


Respiratory exam: Present: normal lung sounds bilaterally.  Absent: respiratory 

distress, wheezes, rales, rhonchi, stridor





- Cardiovascular


Cardiovascular Exam: Present: regular rate, normal rhythm, normal heart sounds. 

 Absent: systolic murmur, diastolic murmur, rubs, gallop





- GI/Abdominal


GI/Abdominal exam: Present: soft, normal bowel sounds.  Absent: distended, 

tenderness, guarding, rebound, rigid





- Rectal


Rectal exam: Present: deferred, other (There is a large fluctuant area to the 

right buttocks measuring 6 cm with indurated skin surrounding.  There is 

tenderness palpation.  This does not appear to involve the rectum.  This does 

track around to the bottom of the perineum.  There is no crepitus.)





- Extremities Exam


Extremities exam: Present: normal inspection, full ROM, normal capillary refill.

  Absent: tenderness, calf tenderness





- Back Exam


Back exam: Present: normal inspection, full ROM.  Absent: tenderness, CVA 

tenderness (R)





- Neurological Exam


Neurological exam: Present: alert, oriented X3





- Psychiatric


Psychiatric exam: Present: normal affect, normal mood





- Skin


Skin exam: Present: warm, dry, intact, normal color.  Absent: rash





ED Course





                                   Vital Signs











  08/29/21





  13:33


 


Temperature 98 F


 


Pulse Rate 84


 


Respiratory 20





Rate 


 


Blood Pressure 100/61


 


O2 Sat by Pulse 98





Oximetry 














- Reevaluation(s)


Reevaluation #1: 





08/29/21 16:14


Patient is overall well-appearing.  Abscess is very large on the buttocks.  I 

did drain it and a copious amount of purulent drainage was expressed.  The wound

 was irrigated thoroughly and packed.  We will treat the patient with 

antibiotics, pain medication and recommended frequent warm moist compresses or 

sitz bath's.  I will give her surgery follow-up and strict return precautions 

for any change or worsening symptoms.  I have low suspicion for necrotizing 

fasciitis or other acute process such as intra-abdominal abscess due to lack of 

exam findings.  Overall the patient is well-appearing does not meet sirs or 

sepsis criteria and does not have any other systemic symptoms at this time.  She

 was agreeable to this plan and understood that if she develops any systemic 

symptoms or any change or worsening symptoms she should return to the emergency 

department immediately.  Recommended primary care follow-up and surgery follow-

up in the next 1 to 2 days.


08/29/21 16:15








- I & D


  ** Right Buttocks


Type of Procedure: Complex


Site: Right buttocks


Blade Size: 11


I & D Procedure: betadine prep, sterile drapes applied, sterile dressing 

applied, gauze wick placed


Progress: 





The area was first cleaned with Betadine prep.  I then injected 1% lidocaine 

without epinephrine in a wheal around the area of most fluctuance.  I then made 

a 1 cm incision and a copious amount of purulent drainage was expressed.  I then

 deloculated and irrigated the wound and the wound was packed with 1 inch 

iodoform packing.  Patient tolerated the procedure well.  Less than 5 mL of 

blood loss





ED Medical Decision Making





- Medical Decision Making





She felt much better after drainage.  Educated the patient that if she were to 

develop any change or worsening symptoms she need to return to the emerge 

department immediately.  She verbalized understand these instructions.  I will 

treat her with Bactrim, Keflex, pain medication and strict return precautions.  

All of her questions were answered and she was agreeable this plan.





- Differential Diagnosis


Abscess, cellulitis, insect bite


Critical care attestation.: 


If time is entered above; I have spent that time in minutes in the direct care 

of this critically ill patient, excluding procedure time.








ED Disposition


Clinical Impression: 


 Abscess of buttock, right





Disposition: 01 HOME / SELF CARE / HOMELESS


Is pt being admited?: No


Condition: Stable


Instructions:  Skin Abscess, Incision and Drainage, Care After


Prescriptions: 


Acetaminophen with Codeine [Acetaminophen-Codeine #4 TAB] 1 each PO Q6HR #12 

tablet


Sulfamethoxazole/Trimethoprim [Bactrim DS TAB] 1 each PO BID #20 tablet


cephALEXin [Keflex] 500 mg PO Q6HR #40 capsule


Referrals: 


PRIMARY CARE,MD [Primary Care Provider] - 3-5 Days


SYLVIA PAYNE MD [Staff Physician] - 3-5 Days


EMILY SEWELL MD [Staff Physician] - 3-5 Days


Time of Disposition: 16:17 No